# Patient Record
Sex: FEMALE | Race: WHITE
[De-identification: names, ages, dates, MRNs, and addresses within clinical notes are randomized per-mention and may not be internally consistent; named-entity substitution may affect disease eponyms.]

---

## 2018-08-24 ENCOUNTER — HOSPITAL ENCOUNTER (EMERGENCY)
Dept: HOSPITAL 45 - C.EDB | Age: 23
Discharge: HOME | End: 2018-08-24
Payer: COMMERCIAL

## 2018-08-24 VITALS
BODY MASS INDEX: 22.32 KG/M2 | WEIGHT: 138.89 LBS | HEIGHT: 65.98 IN | WEIGHT: 138.89 LBS | BODY MASS INDEX: 22.32 KG/M2 | HEIGHT: 65.98 IN

## 2018-08-24 VITALS — OXYGEN SATURATION: 96 % | DIASTOLIC BLOOD PRESSURE: 76 MMHG | HEART RATE: 85 BPM | SYSTOLIC BLOOD PRESSURE: 106 MMHG

## 2018-08-24 VITALS — TEMPERATURE: 98.6 F
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
## 2018-08-24 DIAGNOSIS — S93.401A: ICD-10-CM

## 2018-08-24 DIAGNOSIS — S93.601A: Primary | ICD-10-CM

## 2018-08-24 NOTE — EMERGENCY ROOM VISIT NOTE
ED Visit Note


First contact with patient:  14:03


CHIEF COMPLAINT: Right foot and ankle injury 12 hours ago





HISTORY OF PRESENT ILLNESS: Patient is a 23-year-old female who presents 

emergency department for evaluation of right foot and ankle pain.  She was 

wearing high heels and walking on uneven gravel and she sustained an inversion 

injury to the ankle.  She notes pain in the medial aspect of the foot and ankle 

that is worse with weightbearing.  She rates her discomfort a 6/10.  She took 

ibuprofen this morning for discomfort.  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





REVIEW OF SYSTEMS: Review of systems as per HPI.  All other systems reviewed 

were negative.  At least 6 systems reviewed





PMH: Electronic medical records are reviewed and summarized as above/below.  

See Problem List.





SOCIAL HISTORY:  Patient is a college student from [place] who lives on 

campus.  Non-smoker.





PHYSICAL EXAM: Vital Signs: Reviewed Nurse's notes.  MENTAL STATUS: Alert, 

oriented, and cooperative.  MUSCULOSKELETAL: Examination of the right foot and 

ankle do not note any significant soft tissue swelling, erythema or ecchymosis.

  The right ankle is slightly tender over the medial aspect but the skin is 

intact and there is no ligamentous instability.  No pain over the 5th 

metatarsal or fibular head.  She does have discomfort in the arch of her foot 

along the first metatarsal.  Lisfranc joint is negative.  There is no 

deformity.  The foot and toes are warm and well-perfused.  Sensation to pain 

and light touch is intact.





EMERGENCY DEPARTMENT COURSE:  X-ray of the right foot and ankle were obtained 

and were negative for acute fracture.  Patient was placed in a walking boot.  

She declined crutches.  Supportive care measures were discussed.  She was 

encouraged to follow-up with [facility] if her symptoms are not 

improving.





Differential diagnosis include foot verses ankle sprain/fracture, contusion, 

dislocation.





Medication reconciliation: I attest that I have personally reviewed the patient'

s current medication list.


Blood pressure screening : Patient was found to have normal blood pressure on 

screening and does not require follow-up.








RIGHT ANKLE 3 VIEWS





CLINICAL HISTORY: Right ankle injury.





FINDINGS: 3 views of the right ankle are obtained. No prior studies are


available for comparison at the time of dictation. The skeletal structures are


well mineralized. No fracture is seen. The ankle mortise is intact. There is no


joint effusion. Mild soft tissue swelling is present around the ankle.





IMPRESSION: Mild soft tissue swelling with no radiographic evidence of fracture.





RIGHT FOOT 3 VIEWS





CLINICAL HISTORY: Right foot injury.





FINDINGS: 3 views of the right foot are obtained. No prior studies are available


for comparison at the time of dictation. The skeletal structures are well


mineralized. No fracture is seen. The joint spaces of the foot are maintained.


The overlying soft tissues are normal in appearance.





IMPRESSION: There is no radiographic evidence of right foot fracture.


Current/Historical Medications


Scheduled


Birth Control Pills (Birth Control Pills), 1 TAB PO DAILY





Allergies


Coded Allergies:  


     Penicillins (Unverified  Allergy, Unknown, CHILDHOOD ALLERGY, 8/24/18)





Vital Signs











  Date Time  Temp Pulse Resp B/P (MAP) Pulse Ox O2 Delivery O2 Flow Rate FiO2


 


8/24/18 13:57 37.0 106 20 119/85 98 Room Air  











Departure Information


Impression





 Primary Impression:  


 Right foot sprain


 Additional Impression:  


 Right ankle sprain





Referrals


[facility]





Patient Instructions


[facility]





Additional Instructions





Ibuprofen(Motrin, Advil) may be used for fever or pain.  Use 600mg every six 

hours as needed.  Take with food.  Avoid using more than 2400mg in a 24 hour 

period.  Do not use 2400mg per day for more than three consecutive days without 

physician direction.  Prolonged inappropriate use can lead to stomach upset or 

ulcers.  This medication can be taken if you need to drive, work, or perform 

activities which may be dangerous when taking narcotic pain medication.


(AND/OR)


Acetaminophen(Tylenol) may be used for fever or pain.  Use 1000mg every six 

hours as needed.  Avoid using more than 3000mg in a 24 hour period.  This 

medication can be taken if you need to drive, work, or perform activities which 

may be dangerous when taking narcotic pain medication.





Ice compresses for 20 minutes at a time four times daily for 2-3 days.





Use the walking boot as instructed. 





Rest and elevate your injury.





Continue current medications.





Return to the ER immediately for any numbness, tingling, severe pain, extreme 

swelling in the extremity or as needed.





Followup with [facility] if your symptoms are not improving in 

the next 7-10 days.





Problem Qualifiers

## 2018-08-24 NOTE — DIAGNOSTIC IMAGING REPORT
RIGHT FOOT 3 VIEWS



CLINICAL HISTORY: Right foot injury.



FINDINGS: 3 views of the right foot are obtained. No prior studies are available

for comparison at the time of dictation. The skeletal structures are well

mineralized. No fracture is seen. The joint spaces of the foot are maintained.

The overlying soft tissues are normal in appearance.



IMPRESSION: There is no radiographic evidence of right foot fracture.







Electronically signed by:  James Navarro M.D.

8/24/2018 2:32 PM



Dictated Date/Time:  8/24/2018 2:32 PM

## 2018-08-24 NOTE — DIAGNOSTIC IMAGING REPORT
RIGHT ANKLE 3 VIEWS



CLINICAL HISTORY: Right ankle injury.



FINDINGS: 3 views of the right ankle are obtained. No prior studies are

available for comparison at the time of dictation. The skeletal structures are

well mineralized. No fracture is seen. The ankle mortise is intact. There is no

joint effusion. Mild soft tissue swelling is present around the ankle.



IMPRESSION: Mild soft tissue swelling with no radiographic evidence of fracture.







Electronically signed by:  James Navarro M.D.

8/24/2018 2:31 PM



Dictated Date/Time:  8/24/2018 2:30 PM